# Patient Record
Sex: FEMALE | Race: WHITE
[De-identification: names, ages, dates, MRNs, and addresses within clinical notes are randomized per-mention and may not be internally consistent; named-entity substitution may affect disease eponyms.]

---

## 2017-09-04 ENCOUNTER — HOSPITAL ENCOUNTER (EMERGENCY)
Dept: HOSPITAL 36 - ER | Age: 47
Discharge: HOME | End: 2017-09-04
Payer: MEDICARE

## 2017-09-04 DIAGNOSIS — E78.5: ICD-10-CM

## 2017-09-04 DIAGNOSIS — I10: ICD-10-CM

## 2017-09-04 DIAGNOSIS — R10.11: Primary | ICD-10-CM

## 2017-09-04 DIAGNOSIS — G89.29: ICD-10-CM

## 2017-09-04 DIAGNOSIS — D64.9: ICD-10-CM

## 2017-09-04 DIAGNOSIS — Z90.49: ICD-10-CM

## 2017-09-04 LAB
ALBUMIN/GLOB SERPL: 1.4 {RATIO} (ref 1–1.8)
ALP SERPL-CCNC: 78 U/L (ref 34–104)
ALT SERPL-CCNC: 10 U/L (ref 7–52)
AMPHET UR-MCNC: NEGATIVE NG/ML
ANION GAP SERPL CALC-SCNC: 10.5 MMOL/L (ref 7–16)
ANISOCYTOSIS BLD QL SMEAR: (no result)
AST SERPL-CCNC: 12 U/L (ref 13–39)
BACTERIA #/AREA URNS HPF: (no result) /HPF
BARBITURATES UR-MCNC: NEGATIVE UG/ML
BILIRUB SERPL-MCNC: 0.3 MG/DL (ref 0.3–1)
BILIRUB UR-MCNC: NEGATIVE MG/DL
BUN SERPL-MCNC: 22 MG/DL (ref 7–25)
BUN/CREAT SERPL: 18.3
CALCIUM SERPL-MCNC: 9 MG/DL (ref 8.6–10.3)
CHLORIDE SERPL-SCNC: 105 MEQ/L (ref 98–107)
CHOLEST SERPL-MCNC: 209 MG/DL (ref ?–200)
CO2 SERPL-SCNC: 21.2 MEQ/L (ref 21–31)
COLOR UR: YELLOW
CREAT SERPL-MCNC: 1.2 MG/DL (ref 0.6–1.2)
EOSINOPHIL NFR BLD: 2 % (ref 0–5)
EPI CELLS URNS QL MICRO: (no result) /LPF
ERYTHROCYTE [DISTWIDTH] IN BLOOD BY AUTOMATED COUNT: 14.9 % (ref 11.5–20)
GLOBULIN SER-MCNC: 3.1 GM/DL
GLUCOSE SERPL-MCNC: 124 MG/DL (ref 70–105)
GLUCOSE UR STRIP-MCNC: NEGATIVE MG/DL
HCT VFR BLD CALC: 28.4 % (ref 35–45)
HGB BLD-MCNC: 10 G/DL (ref 4–35)
HGB BLD-MCNC: 9.1 GM/DL (ref 11.7–15.5)
INR PPP: 0.9 (ref 0.5–1.4)
KETONES UR STRIP-MCNC: NEGATIVE MG/DL
MCH RBC QN AUTO: 22.2 PG (ref 27–31)
MCHC RBC AUTO-ENTMCNC: 32 PG (ref 28–36)
MCV RBC AUTO: 69.5 FL (ref 81–100)
METHADONE UR CFM-MCNC: NEGATIVE NG/ML
MICROCYTES BLD QL SMEAR: (no result)
NEUTROPHILS NFR BLD AUTO: 71 % (ref 40–80)
PH UR STRIP: 6 [PH] (ref 4.6–8)
PLAT MORPH BLD: NORMAL
PLATELET # BLD EST: ADEQUATE 10*3/UL
PLATELET # BLD: 245 TH/CMM (ref 150–400)
PMV BLD AUTO: 10.3 FL
POTASSIUM SERPL-SCNC: 3.7 MEQ/L (ref 3.5–5.1)
PROT UR STRIP-MCNC: 100 MG/DL
PROTHROMBIN TIME: 9.3 SECONDS (ref 9.5–11.5)
RBC # BLD AUTO: 4.09 MIL/CMM (ref 3.8–5.1)
RBC # UR STRIP: (no result) /UL
RBC #/AREA URNS HPF: (no result) /HPF (ref 0–5)
RBC MORPH BLD: (no result)
SODIUM SERPL-SCNC: 133 MEQ/L (ref 136–145)
TOTAL CELLS COUNTED BLD: 100
TRIGL SERPL-MCNC: 115 MG/DL (ref ?–150)
UROBILINOGEN UR STRIP-ACNC: 0.2 E.U./DL (ref 0.2–1)
WBC # BLD AUTO: 10.1 TH/CMM (ref 4.8–10.8)
WBC #/AREA URNS HPF: (no result) /HPF (ref 0–5)

## 2017-09-04 PROCEDURE — 80307 DRUG TEST PRSMV CHEM ANLYZR: CPT

## 2017-09-04 PROCEDURE — 99285 EMERGENCY DEPT VISIT HI MDM: CPT

## 2017-09-04 PROCEDURE — 86592 SYPHILIS TEST NON-TREP QUAL: CPT

## 2017-09-04 PROCEDURE — 84443 ASSAY THYROID STIM HORMONE: CPT

## 2017-09-04 PROCEDURE — 81001 URINALYSIS AUTO W/SCOPE: CPT

## 2017-09-04 PROCEDURE — 36415 COLL VENOUS BLD VENIPUNCTURE: CPT

## 2017-09-04 PROCEDURE — 74176 CT ABD & PELVIS W/O CONTRAST: CPT

## 2017-09-04 PROCEDURE — 85027 COMPLETE CBC AUTOMATED: CPT

## 2017-09-04 PROCEDURE — 83036 HEMOGLOBIN GLYCOSYLATED A1C: CPT

## 2017-09-04 PROCEDURE — 80061 LIPID PANEL: CPT

## 2017-09-04 PROCEDURE — 84484 ASSAY OF TROPONIN QUANT: CPT

## 2017-09-04 PROCEDURE — 81025 URINE PREGNANCY TEST: CPT

## 2017-09-04 PROCEDURE — 80053 COMPREHEN METABOLIC PANEL: CPT

## 2017-09-04 PROCEDURE — 71250 CT THORAX DX C-: CPT

## 2017-09-04 PROCEDURE — 96372 THER/PROPH/DIAG INJ SC/IM: CPT

## 2017-09-04 PROCEDURE — 85610 PROTHROMBIN TIME: CPT

## 2017-09-04 PROCEDURE — 85007 BL SMEAR W/DIFF WBC COUNT: CPT

## 2017-09-04 PROCEDURE — 93005 ELECTROCARDIOGRAM TRACING: CPT

## 2017-09-04 PROCEDURE — 85730 THROMBOPLASTIN TIME PARTIAL: CPT

## 2017-09-04 NOTE — ED PHYSICIAN CHART
Chief Complaint/HPI





- Patient Information


Date Seen:: 17


Time Seen:: 20:45


Chief Complaint:: CHEST PAIN


History of Present Illness:: 





THIS IS A 48 YO FEMALE WITH A COMPLAINT OF CHEST PAIN, RIGHT UPPER QUADRANT 

ABDOMINAL PAIN WITH NO SOB, NO NAUSEA, NO VOMITING. SHE ALSO STATES THAT SHE 

HAS WHOLE BODY PAIN.  SHE ASKED FOR DILAUDID BECAUSE SHE RAN OUT OF HERS.  SHE 

DENIE ANY PREVIOUS HEART PROBLEMS. SHE STATES THAT SHE IS CURRENTLY UNDER PAIN 

MANAGEMENT FOR HER ON GOING PROBLEM OF PAIN.


Allergies:: 


 Allergies











Allergy/AdvReac Type Severity Reaction Status Date / Time


 


atorvastatin Allergy   Verified 16 09:01


 


baclofen Allergy   Verified 16 02:42


 


gabapentin Allergy   Verified 16 02:42


 


pregabalin [From Lyrica] Allergy   Verified 16 02:42


 


zolpidem Allergy   Verified 16 09:01











Vitals:: 


 Vital Signs - 8 hr











  17





  20:40


 


Temp 98.6 F


 


HR 85


 


RR 20


 


/78


 


O2 Sat % 100











Historian:: Patient


Review:: Nurse's Note Reviewed, Old Chart Reviewed





Review of Systems





- Review of Systems


General/Constitutional: No fever, No chills, No weight loss, No weakness, No 

diaphoresis, No edema, No loss of appetite


Skin: No skin lesions, No rash, No bruising


Head: No headache, No light-headedness


Eyes: No loss of vision, No pain, No diplopia


ENT: No earache, No nasal drainage, No sore throat, No tinnitus


Neck: No neck pain, No swelling, No thyromegaly, No stiffness, No mass noted


Cardio Vascular: Chest pain, No palpitations, No PND, No orthopnea, No edema


Pulmonary: No SOB, No cough, No wheezing


GI: No nausea, No vomiting, No diarrhea, Pain, No melena, No hematochezia, No 

constipation, No hematemesis


G/U: No dysuria, No frequency, No hematuria


Musculoskeletal: No bone or joint pain, No back pain, No muscle pain


Endocrine: No polyuria, No polydipsia


Psychiatric: No prior psych history, No depression, No anxiety, No suicidal 

ideation


Hematopoietic: No bruising, No lymphadenopathy


Allergic/Immuno: No urticaria, No angioedema


Neurological: No syncope, No focal symptoms, No weakness, No paresthesia, No 

headache, No seizure, No dizziness, No confusion, No vertigo





Past Medical History





- Past Medical History


Obtainable: Yes


Past Medical History: HTN, Dyslipidemia, Arthritis, Other (CHRONIC PAIN DISORDER

)


Family History: None


Social History: Non Smoker, No Alcohol, Illicit Drug Use


Surgical History: Cholecystectomy, other (CATARACT SURGERY, APPENDECTOMY)


Psychiatricy History: Depression





Family Medical History





- Family Member


  ** Mother


History Unknown: Yes


Ethnicity: Non-


Living Status: 


Hx Family Cancer: Yes


Hx Family Hypertension: No


Hx Family Diabetes: Yes





  ** Father


History Unknown: Yes


Living Status: 


Hx Family Congestive Heart Failure: Yes





  ** Maternal Grandmother


History Unknown: Yes


Living Status: 


Hx Family Cancer: Yes


Hx Family Diabetes: Yes





Physical Exam





- Physical Examination


General/Constitutional: Awake, Well-developed, well-nourished, Alert, No 

distress, GCS 15, Non-toxic appearing, Ambulatory


Head: Atraumatic


Eyes: Lids, conjuctiva normal, PERRL, EOMI


Skin: Nl inspection, No rash, No skin lesions, No ecchymosis, Well hydrated, No 

lymphadenopathy


ENMT: External ears, nose nl, Nasal exam nl, Lips, teeth, gums nl


Neck: Nontender, Full ROM w/o pain, No JVD, No nuchal rigidity, No bruit, No 

mass, No stridor


Respiratory: Nl effort/Exclusion, Clear to Auscultation, No Wheeze/Rhonchi/Rales


Cardio Vascular: RRR, No murmur, gallop, rubs, NL S1 S2


GI: No tenderness/rebounding/guarding, No organomegaly, No hernia, Normal BS's, 

Nondistended, No mass/bruits, No McBurney tenderness


: No CVA tenderness


Extremities: No tenderness or effusion, Full ROM, normal strength in all 

extremities, No edema, Normal digits & nails


Neuro/Psych: Alert/oriented, DTR's symmetric, Normal sensory exam, Normal motor 

strength, Judgement/insight normal, Mood normal, Normal gait, No focal deficits


Misc: normal gait, Normal back, No paraspinal tenderness





Labs/Radiology/EKG Results





- Lab Results


Results: 


 Laboratory Tests











  17





  20:45 20:45 20:45


 


WBC   


 


RBC   


 


Hgb   


 


Hct   


 


MCV   


 


MCH   


 


MCHC Differential   


 


RDW   


 


Plt Count   


 


MPV   


 


Neutrophils (Manual)   


 


Lymphocytes   


 


Monocytes   


 


Eosinophils   


 


Platelet Estimate   


 


Platelet Morphology   


 


Anisocytosis   


 


Microcytosis   


 


RBC Morph Micro Appear   


 


PT   


 


INR   


 


PTT (Actin FS)   


 


Sodium   


 


Potassium   


 


Chloride   


 


Carbon Dioxide   


 


Anion Gap   


 


BUN   


 


Creatinine   


 


Est GFR ( Amer)   


 


Est GFR (Non-Af Amer)   


 


BUN/Creatinine Ratio   


 


Glucose   


 


Hemoglobin A1c %   


 


Calcium   


 


Total Bilirubin   


 


AST   


 


ALT   


 


Alkaline Phosphatase   


 


Troponin I   


 


Total Protein   


 


Albumin   


 


Globulin   


 


Albumin/Globulin Ratio   


 


Triglycerides   


 


Cholesterol   


 


LDL Cholesterol Direct   


 


HDL Cholesterol   


 


Urine Source  CLEAN C  


 


Urine Color  YELLOW  


 


Urine Clarity  CLEAR  


 


Urine pH  6.0  


 


Ur Specific Gravity  1.020  


 


Urine Protein  100 H  


 


Urine Glucose (UA)  NEGATIVE  


 


Urine Ketones  NEGATIVE  


 


Urine Blood  TRACE  


 


Urine Nitrate  NEGATIVE  


 


Urine Bilirubin  NEGATIVE  


 


Urine Urobilinogen  0.2  


 


Ur Leukocyte Esterase  NEGATIVE  


 


Urine RBC  2-5  


 


Urine WBC  0-2  


 


Ur Epithelial Cells  MODERATE  


 


Urine Bacteria  OCCASIONAL  


 


Urine Pregnancy Test   NEGATIVE 


 


Urine Opiates Screen    POSITIVE H


 


Urine Methadone Screen    NEGATIVE


 


Ur Barbiturates Screen    NEGATIVE


 


Ur Tricyclics Screen    NEGATIVE


 


Ur Phencyclidine Scrn    NEGATIVE


 


Amphetamines Screen    NEGATIVE


 


U Methamphetamines Scrn    NEGATIVE


 


U Benzodiazepines Scrn    NEGATIVE


 


U Cocaine Metab Screen    NEGATIVE


 


U Cannabinoids Screen    NEGATIVE














  17





  21:10 21:10 21:10


 


WBC    10.1


 


RBC    4.09


 


Hgb    9.1 L


 


Hct    28.4 L


 


MCV    69.5 L


 


MCH    22.2 L


 


MCHC Differential    32.0


 


RDW    14.9


 


Plt Count    245


 


MPV    10.3


 


Neutrophils (Manual)    71


 


Lymphocytes    23


 


Monocytes    4


 


Eosinophils    2


 


Platelet Estimate    ADEQUATE


 


Platelet Morphology    NORMAL


 


Anisocytosis    1+


 


Microcytosis    2+


 


RBC Morph Micro Appear    ABNORMAL


 


PT  9.3 L  


 


INR  0.90  


 


PTT (Actin FS)  20.5 L  


 


Sodium   


 


Potassium   


 


Chloride   


 


Carbon Dioxide   


 


Anion Gap   


 


BUN   


 


Creatinine   


 


Est GFR ( Amer)   


 


Est GFR (Non-Af Amer)   


 


BUN/Creatinine Ratio   


 


Glucose   


 


Hemoglobin A1c %   


 


Calcium   


 


Total Bilirubin   


 


AST   


 


ALT   


 


Alkaline Phosphatase   


 


Troponin I   


 


Total Protein   


 


Albumin   


 


Globulin   


 


Albumin/Globulin Ratio   


 


Triglycerides   115 


 


Cholesterol   209 H 


 


LDL Cholesterol Direct   155 


 


HDL Cholesterol   46 


 


Urine Source   


 


Urine Color   


 


Urine Clarity   


 


Urine pH   


 


Ur Specific Gravity   


 


Urine Protein   


 


Urine Glucose (UA)   


 


Urine Ketones   


 


Urine Blood   


 


Urine Nitrate   


 


Urine Bilirubin   


 


Urine Urobilinogen   


 


Ur Leukocyte Esterase   


 


Urine RBC   


 


Urine WBC   


 


Ur Epithelial Cells   


 


Urine Bacteria   


 


Urine Pregnancy Test   


 


Urine Opiates Screen   


 


Urine Methadone Screen   


 


Ur Barbiturates Screen   


 


Ur Tricyclics Screen   


 


Ur Phencyclidine Scrn   


 


Amphetamines Screen   


 


U Methamphetamines Scrn   


 


U Benzodiazepines Scrn   


 


U Cocaine Metab Screen   


 


U Cannabinoids Screen   














  17





  21:10 21:10 21:10


 


WBC   


 


RBC   


 


Hgb   


 


Hct   


 


MCV   


 


MCH   


 


MCHC Differential   


 


RDW   


 


Plt Count   


 


MPV   


 


Neutrophils (Manual)   


 


Lymphocytes   


 


Monocytes   


 


Eosinophils   


 


Platelet Estimate   


 


Platelet Morphology   


 


Anisocytosis   


 


Microcytosis   


 


RBC Morph Micro Appear   


 


PT   


 


INR   


 


PTT (Actin FS)   


 


Sodium  133 L  


 


Potassium  3.7  


 


Chloride  105  


 


Carbon Dioxide  21.2  


 


Anion Gap  10.5  


 


BUN  22  


 


Creatinine  1.2  


 


Est GFR ( Amer)  > 60.0  


 


Est GFR (Non-Af Amer)  51.6  


 


BUN/Creatinine Ratio  18.3  


 


Glucose  124 H  


 


Hemoglobin A1c %    6.5 H


 


Calcium  9.0  


 


Total Bilirubin  0.3  


 


AST  12 L  


 


ALT  10  


 


Alkaline Phosphatase  78  


 


Troponin I   < 0.01 L 


 


Total Protein  7.4  


 


Albumin  4.3  


 


Globulin  3.1  


 


Albumin/Globulin Ratio  1.4  


 


Triglycerides   


 


Cholesterol   


 


LDL Cholesterol Direct   


 


HDL Cholesterol   


 


Urine Source   


 


Urine Color   


 


Urine Clarity   


 


Urine pH   


 


Ur Specific Gravity   


 


Urine Protein   


 


Urine Glucose (UA)   


 


Urine Ketones   


 


Urine Blood   


 


Urine Nitrate   


 


Urine Bilirubin   


 


Urine Urobilinogen   


 


Ur Leukocyte Esterase   


 


Urine RBC   


 


Urine WBC   


 


Ur Epithelial Cells   


 


Urine Bacteria   


 


Urine Pregnancy Test   


 


Urine Opiates Screen   


 


Urine Methadone Screen   


 


Ur Barbiturates Screen   


 


Ur Tricyclics Screen   


 


Ur Phencyclidine Scrn   


 


Amphetamines Screen   


 


U Methamphetamines Scrn   


 


U Benzodiazepines Scrn   


 


U Cocaine Metab Screen   


 


U Cannabinoids Screen   














- Radiology Results


Results: 





CT SCAN OF THE CHEST = NAD





- EKG Interpretations


EKG Time:: 21:10


Rate & Rhythm: RATE=79 ,SINUS


Axis: LEFT





Assessment





- Assessment


General Assessment: 


CHEST PAIN


ABDOMINAL PAIN


ANEMIA





ED Septic Shock





- .


Is Septic Shock (SBP<90, OR Lactate>4 mmol\L) present?: No





- <6hrs of presentation:


Vital Signs: 


 Vital Signs - 8 hr











  17





  20:40


 


Temp 98.6 F


 


HR 85


 


RR 20


 


/78


 


O2 Sat % 100














Reassessment (Disposition)





- Reassessment


Reassessment Condition:: Improved





- Diagnosis


Diagnosis:: 





ANEMIA


CHRONIC PAIN


ABDOMINAL PAIN





- Aftercare/Follow up Instructions


Aftercare/Follow-Up Instructions:: Counseled pt regarding lab results/diagnosis 

& need follow up, Refer to Discharge Instructions, Counseled pt & family 

regarding lab results/diagnosis & need follow up


Medication Prescribed:: 





SEE YOUR DOCTOR IN THE AM WITH A COPY OF THE LABS.





- Patient Disposition


Discharge/Transfer:: Home


Condition at Disposition:: Improved





ED Discharge Plan





- Patient Disposition


Admit/Discharge/Transfer: PT DISCHARGED HOME


Condition at Disposition: Improved


Instructions:  Anemia, Nonspecific-Brief, Chest Wall Pain, Easy-to-Read, 

Chronic Pain


Additional Instructions: 


FOLLOW UP WITH YOUR DOCTOR IN  THE MORNING AND SHOW HIM THE COPIES OF LAB 

RESULTS AND TO COME BACK TO ER IF SYMPTOMS WORSEN

## 2017-09-05 NOTE — DIAGNOSTIC IMAGING REPORT
CT scan abdomen and pelvis without intravenous contrast



HISTORY: Pain



Total DLP equals 924



CTDI equals 16.5



Axial sections were obtained from the xiphoid process down to the pubic

symphysis.



The liver exhibits a homogeneous parenchyma. No focal lesions. The

spleen appears normal. Surgical changes noted in the gastric/perigastric

region. Additional surgical clips seen within the sonia hepatis region

consistent with a prior cholecystectomy. Findings consistent bilateral

renal cysts are seen. Punctate nonobstructing calcification noted in the

medullary region of the right kidney. Calcification noted in the left

renal hilum that appears vascular.



The exam of the pelvis demonstrates a bulbous shaped somewhat irregular

shaped uterus that may be associated with fibroid changes. An ultrasound

exam would provide additional assessment. Mild right adnexal fullness

with hypodensity is probably related to ovarian cystic changes. Findings

consistent with a tampon seen within the vaginal region. No free fluid

within the pelvis.



Electrode devices seen within the subcutaneous tissues over the right

anterior pelvis and over the left gluteal region. Spinal stimulator

wires traverse the lower thoracic and lumbar spinal canal regions.



IMPRESSION:

1. Surgical changes within the gastric/perigastric region. Findings

consistent with gastric bypass surgery. Intraluminal debris seen within

the native stomach. Additional findings of a prior cholecystectomy

noted.

2. Bulbous shaped irregular shaped uterus along with findings suggesting

fibroid changes. If necessary, a pelvic ultrasound exam would provide

additional assessment.

3. Right adnexal fullness with hypodensity probably related to ovarian

cystic changes

4. Findings consistent with a vaginal tampon

5. Nonobstructing right renal calculus along with bilateral renal cysts

6. Electrode stimulating devices as noted above along with electrode

wires traversing the lower thoracic and lumbar spinal canal areas.

## 2017-09-05 NOTE — DIAGNOSTIC IMAGING REPORT
CT scan of the chest without intravenous contrast



HISTORY: Pain



Total DLP equals 350



CTDI equals 10.3



Axial sections were obtained from a level above the clavicles down to

level below the diaphragm.



The heart size is normal. No abnormal mediastinal masses. Mild

atherosclerotic calcination seen within the aorta and coronary artery

regions. The hilar areas are unremarkable. No free fluid in the pelvis.

No significant abnormal focal pulmonary parenchymal processes.



Suggestion of nodular changes within the thyroid gland. A thyroid

ultrasound exam would provide additional assessment.



Limited sections below the diaphragm demonstrate changes of a prior

cholecystectomy along with surgical changes within the

gastric/perigastric area. Intraluminal debris seen within the native

stomach. Significance should be correlated clinically.



IMPRESSION:

1. No acute abnormalities

2. Mild atherosclerotic coronary artery calcification

3. Suggestion of thyroid nodules. A thyroid ultrasound exam would

provide additional detail.

4. Surgical changes including findings of prior gastric bypass surgery.

Intraluminal debris seen within the somewhat distended native stomach.

Significance should be correlated clinically.

## 2018-03-18 ENCOUNTER — HOSPITAL ENCOUNTER (EMERGENCY)
Dept: HOSPITAL 4 - SED | Age: 48
Discharge: HOME | End: 2018-03-18
Payer: COMMERCIAL

## 2018-03-18 VITALS — SYSTOLIC BLOOD PRESSURE: 165 MMHG

## 2018-03-18 VITALS — SYSTOLIC BLOOD PRESSURE: 132 MMHG

## 2018-03-18 VITALS — HEIGHT: 68 IN | BODY MASS INDEX: 31.37 KG/M2 | WEIGHT: 207 LBS

## 2018-03-18 DIAGNOSIS — Z79.899: ICD-10-CM
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
## 2018-03-18 DIAGNOSIS — Z88.8: ICD-10-CM

## 2018-03-18 DIAGNOSIS — S80.212A: ICD-10-CM

## 2018-03-18 DIAGNOSIS — Z79.82: ICD-10-CM

## 2018-03-18 DIAGNOSIS — Z86.73: ICD-10-CM

## 2018-03-18 DIAGNOSIS — Z90.49: ICD-10-CM

## 2018-03-18 DIAGNOSIS — S00.81XA: Primary | ICD-10-CM
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
## 2018-03-18 DIAGNOSIS — Z85.41: ICD-10-CM

## 2018-03-18 DIAGNOSIS — I10: ICD-10-CM
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
## 2018-03-18 DIAGNOSIS — E11.9: ICD-10-CM

## 2018-03-18 PROCEDURE — 73140 X-RAY EXAM OF FINGER(S): CPT

## 2018-03-18 PROCEDURE — 70450 CT HEAD/BRAIN W/O DYE: CPT

## 2018-03-18 PROCEDURE — 99284 EMERGENCY DEPT VISIT MOD MDM: CPT

## 2018-03-18 PROCEDURE — 73564 X-RAY EXAM KNEE 4 OR MORE: CPT

## 2018-03-18 PROCEDURE — 96372 THER/PROPH/DIAG INJ SC/IM: CPT

## 2018-03-18 PROCEDURE — 73000 X-RAY EXAM OF COLLAR BONE: CPT

## 2018-03-18 PROCEDURE — 70486 CT MAXILLOFACIAL W/O DYE: CPT

## 2018-06-11 ENCOUNTER — HOSPITAL ENCOUNTER (INPATIENT)
Dept: HOSPITAL 4 - SED | Age: 48
Discharge: LEFT BEFORE DISCHARGE | DRG: 812 | End: 2018-06-11
Attending: INTERNAL MEDICINE | Admitting: INTERNAL MEDICINE
Payer: COMMERCIAL

## 2018-06-11 VITALS — SYSTOLIC BLOOD PRESSURE: 141 MMHG

## 2018-06-11 VITALS — SYSTOLIC BLOOD PRESSURE: 152 MMHG

## 2018-06-11 VITALS — SYSTOLIC BLOOD PRESSURE: 136 MMHG

## 2018-06-11 VITALS — SYSTOLIC BLOOD PRESSURE: 155 MMHG

## 2018-06-11 VITALS — BODY MASS INDEX: 31.52 KG/M2 | HEIGHT: 68 IN | WEIGHT: 208 LBS

## 2018-06-11 DIAGNOSIS — G89.4: ICD-10-CM

## 2018-06-11 DIAGNOSIS — E87.6: ICD-10-CM

## 2018-06-11 DIAGNOSIS — Z53.21: ICD-10-CM

## 2018-06-11 DIAGNOSIS — Z85.830: ICD-10-CM

## 2018-06-11 DIAGNOSIS — Z79.899: ICD-10-CM

## 2018-06-11 DIAGNOSIS — E66.9: ICD-10-CM

## 2018-06-11 DIAGNOSIS — Z79.84: ICD-10-CM

## 2018-06-11 DIAGNOSIS — E07.9: ICD-10-CM

## 2018-06-11 DIAGNOSIS — E11.9: ICD-10-CM

## 2018-06-11 DIAGNOSIS — D50.9: Primary | ICD-10-CM

## 2018-06-11 DIAGNOSIS — M79.89: ICD-10-CM

## 2018-06-11 DIAGNOSIS — Z85.89: ICD-10-CM

## 2018-06-11 DIAGNOSIS — Z86.73: ICD-10-CM

## 2018-06-11 DIAGNOSIS — I10: ICD-10-CM

## 2018-06-11 DIAGNOSIS — Z79.82: ICD-10-CM

## 2018-06-11 DIAGNOSIS — J45.909: ICD-10-CM

## 2018-06-11 DIAGNOSIS — Z88.8: ICD-10-CM

## 2018-06-11 DIAGNOSIS — G35: ICD-10-CM

## 2018-06-11 DIAGNOSIS — M54.9: ICD-10-CM

## 2018-06-11 LAB
ALBUMIN SERPL BCP-MCNC: 3.2 G/DL (ref 3.4–4.8)
ALT SERPL W P-5'-P-CCNC: 20 U/L (ref 12–78)
ANION GAP SERPL CALCULATED.3IONS-SCNC: 10 MMOL/L (ref 5–15)
AST SERPL W P-5'-P-CCNC: 25 U/L (ref 10–37)
BASOPHILS # BLD AUTO: 0.1 K/UL (ref 0–0.2)
BASOPHILS NFR BLD AUTO: 0.9 % (ref 0–2)
BILIRUB SERPL-MCNC: 0.3 MG/DL (ref 0–1)
BUN SERPL-MCNC: 18 MG/DL (ref 8–21)
CALCIUM SERPL-MCNC: 8.2 MG/DL (ref 8.4–11)
CHLORIDE SERPL-SCNC: 104 MMOL/L (ref 98–107)
CREAT SERPL-MCNC: 1.2 MG/DL (ref 0.55–1.3)
EOSINOPHIL # BLD AUTO: 0.2 K/UL (ref 0–0.4)
EOSINOPHIL NFR BLD AUTO: 3.7 % (ref 0–4)
ERYTHROCYTE [DISTWIDTH] IN BLOOD BY AUTOMATED COUNT: 20.8 % (ref 9–15)
GFR SERPL CREATININE-BSD FRML MDRD: 62 ML/MIN (ref 90–?)
GLUCOSE SERPL-MCNC: 110 MG/DL (ref 70–99)
HCT VFR BLD AUTO: 22.7 % (ref 36–48)
HGB BLD-MCNC: 6.8 G/DL (ref 12–16)
INR PPP: 1.1 (ref 0.8–1.2)
LYMPHOCYTES # BLD AUTO: 2.6 K/UL (ref 1–5.5)
LYMPHOCYTES NFR BLD AUTO: 38.2 % (ref 20.5–51.5)
MCH RBC QN AUTO: 19 PG (ref 27–31)
MCHC RBC AUTO-ENTMCNC: 30 % (ref 32–36)
MCV RBC AUTO: 64 FL (ref 79–98)
MONOCYTES # BLD MANUAL: 0.4 K/UL (ref 0–1)
MONOCYTES # BLD MANUAL: 5.8 % (ref 1.7–9.3)
NEUTROPHILS # BLD AUTO: 3.4 K/UL (ref 1.8–7.7)
NEUTROPHILS NFR BLD AUTO: 51.4 % (ref 40–70)
PLATELET # BLD AUTO: 159 K/UL (ref 130–430)
POTASSIUM SERPL-SCNC: 3.1 MMOL/L (ref 3.5–5.1)
PROTHROMBIN TIME: 11.2 SECS (ref 9.5–12.5)
RBC # BLD AUTO: 3.53 MIL/UL (ref 4.2–6.2)
SODIUM SERPLBLD-SCNC: 138 MMOL/L (ref 136–145)
TIBC SERPL-MCNC: 332 UG/DL (ref 250–450)
WBC # BLD AUTO: 6.7 K/UL (ref 4.8–10.8)

## 2018-06-11 PROCEDURE — P9021 RED BLOOD CELLS UNIT: HCPCS

## 2018-06-11 PROCEDURE — 30233N1 TRANSFUSION OF NONAUTOLOGOUS RED BLOOD CELLS INTO PERIPHERAL VEIN, PERCUTANEOUS APPROACH: ICD-10-PCS | Performed by: INTERNAL MEDICINE

## 2018-06-11 RX ADMIN — HYDROMORPHONE HYDROCHLORIDE PRN MG: 2 INJECTION INTRAMUSCULAR; INTRAVENOUS; SUBCUTANEOUS at 06:55

## 2018-06-11 RX ADMIN — HYDROMORPHONE HYDROCHLORIDE PRN MG: 2 INJECTION INTRAMUSCULAR; INTRAVENOUS; SUBCUTANEOUS at 11:20

## 2018-06-11 NOTE — NUR
Patient to ER via triage with c/o swelling to bilateral lower extremities x 3 
days. Patient is able to ambulate to bed 2 with slow, steady gait without 
difficulty. Patient is awake, alert and oriented in no acute distress, vital 
signs stable, respirations even and unlabored, skin warm and dry to touch. 
Patient is being evaluated by Dr Peña at bedside evaluating patient. Patient 
reports that she has chronic pain and is on a Dilaudid pump. Patient reports 
chronic back pain rated at an 8, patient declined any pain medication at this 
time.

## 2018-06-11 NOTE — NUR
initial notes

rec patient awake alert with iv on the l c and blood transfusion infusing. no infiltration 
noted but wants iv site to be moved. radha leg swelling noted. resp easy and unlabored. no sob 
noted. bed in mi high position and refused to have it down. stating that she has a hosp bed 
at home. reminded to call nurse when needing assistance but both side rails up and locked. 
will continue to monitor patient.

## 2018-06-11 NOTE — NUR
SPOKE TO DR. QUESADA/NEW ORDERS RECEIVED

Reported patient's 10/10 generalized pain and behavior to Dr. Quesada. Dr. Quesada stated "she's 
the one with the Dilaudid pain pump." I confirmed that the patient stated she did have a 
pain pump to her right lower abdomen. Dr. Quesada ordered Dilaudid 1mg IVP every 3 hours as 
needed for severe (7-10) pain and Dilaudid 0.5mg IVP every 3 hours as needed for moderate 
(4-6) pain. Orders were entered in system. Will wait for inhouse pharmacy to open to call 
for verification.

## 2018-06-11 NOTE — NUR
SUSANNE Kasper from lab called to report first unit of PRBCs is available. Patient stated that blood 
transfusion risk and benefits were explained by ER doctor. Witnessed consent from patient to 
be obtained.

## 2018-06-11 NOTE — NUR
Dr Peña at bedside speaking with patient regarding results and plan of care. 
Questions answered by Dr Peña.

## 2018-06-11 NOTE — NUR
closing notes

pt signed ama despite of dr soriano explanation of doing more test for the patient and 
consulting a gi md. stated that patient has a dental appt today re teeth extraction and bone 
grafting next week. also stated rosalva she needs to go home bec her animals had a vet appt and 
she doesnt want them to die bec of starvation. explained to patient re over the counter iron 
supplement liquid form as per dr soriano for proper absorption. advised to be escorted by 
husam since patient has a lot of belongings but refused.

## 2018-06-11 NOTE — NUR
Patient spit up 1 tab of k-dur during administration stating "Those taste 
terrible! I'm can't take those". Md made aware. Patient having nausea and 
vomiting at this time.

## 2018-06-11 NOTE — NUR
Patient refusing to have both side rails up. Patient received demerol IVP and 
is a Fall risk. Need for side rails to be raised explained to patient, and 
patient consenting to "only one raised. I know I could fall, but I can't stand 
both rails being up."

## 2018-06-11 NOTE — NUR
CLOSING NOTES/REFUSING FOR BED ALARM AND BED TO BE LOWERED

Patient's care and report was endorsed to day shift nurse. Patient is awake, resting, and 
currently receiving the first unit of PRBCs at this time. She is refusing for the bed rails 
to be up, bed alarm and for the height to be at the lowest position. Nurses and staff 
educated patient on safety but continues to refuse for staff to adjust and stated "I have a 
hospital bed at home!" I can take care of myself and walk fine! Day shift nurse was also 
made aware on patient's current demands.

## 2018-06-11 NOTE — NUR
15 MIN VITALS AFTER TRANSFUSION INITIATION

Patient is awake, alert, and oriented with no s/s of acute distress. Vital signs are stable 
after 15 minutes of blood transfusion initiation: 9

-------------------------------------------------------------------------------

Addendum: 06/11/18 at 0802 by Ana Dsouza RN

-------------------------------------------------------------------------------

15 MIN VITALS AFTER TRANSFUSION INITIATION

Patient is awake, alert, and oriented with no s/s of acute distress. Vital signs are stable 
after 15 minutes of blood transfusion initiation: 

-------------------------------------------------------------------------------

Addendum: 06/11/18 at 0803 by Ana Dsouza RN

-------------------------------------------------------------------------------

15 MIN VITALS AFTER TRANSFUSION INITIATION

Patient is awake, alert, and oriented with no s/s of acute distress. Vital signs are stable 
after 15 minutes of blood transfusion initiation: ***97.9F TEMP, 16 RR, 97% O2 SAT, 83 HR, 
141/64 BP.***

## 2018-06-11 NOTE — NUR
Patient stating intention to leave AMA after blood transfusion stating" Well I 
don't really want to stay long, how soon after a transfusion can I just leave?" 
MD made aware and explanation given by MD Dr. Peña on leaving AMA. Patient 
verbalized understanding of risks associated with leaving AMA .

## 2018-06-11 NOTE — NUR
SECURITY AND ADMIT NURSE ESCORTED TO BRING HOME MEDS TO PRIVATE VEHICLE

Patient was refusing to surrender her home medications to nurses to bring to pharmacy per 
hospital policy. Risks and benefits were discussed. Patient demanded that her home 
medications either stay with her or she needs to bring them to her car instead. Patient was 
assisted to her private vehicle with admit nurse, ALBERTO Meade, and security. Patient tolerated 
well and ambulated to assigned room in [address]B.

## 2018-06-11 NOTE — NUR
BT INITIATION:

Consent signed per PATIENT agreeing to administration of blood.  Blood has been type and 
crossmatched.  Blood sent from blood bank.  Information on unit of blood checked against 
patient wristband at bedside by two nurses.  All information matches.  Patient or 
responsible party informed of potential complications associated with blood transfusion.  
Informed of possible transfusion reaction symptoms.  Aware of need to notify nurse at once 
of itching, shortness of breath, flushing, feeling of impending doom, or other symptoms not 
previously present.  Vital signs taken within 5 minutes prior to initiation of transfusion.  
RN will remain with patient for first 15 minutes of transfusion at which time vital signs 
will be re-assessed. VS: 97.7F, 78 HR, 16 RR, 136/80 BP. 

-------------------------------------------------------------------------------

Addendum: 06/11/18 at 0818 by Ana Dsouza RN

-------------------------------------------------------------------------------

BT INITIATION:

Consent signed per PATIENT agreeing to administration of blood.  Blood has been type and 
crossmatched.  Blood sent from blood bank.  Information on unit of blood checked against 
patient wristband at bedside by two nurses.  All information matches.  Patient or 
responsible party informed of potential complications associated with blood transfusion.  
Informed of possible transfusion reaction symptoms.  Aware of need to notify nurse at once 
of itching, shortness of breath, flushing, feeling of impending doom, or other symptoms not 
previously present.  Vital signs taken within 5 minutes prior to initiation of transfusion.  
RN will remain with patient for first 15 minutes of transfusion at which time vital signs 
will be re-assessed. ***PRETRANSFUSION VS AT 0700: 97.7F, 78 HR, 16 RR, 136/80 BP.***

## 2018-06-11 NOTE — NUR
Medication reconciliation completed with information provided by Patient. Any 
prior medication reconciliation on file was reviewed and corrected.

## 2018-06-11 NOTE — NUR
Patient has numerous belongings: Black lap top bag, grocery bag with popcorn, 
cooler with soda, tote back with clothing, belongings bag with clothing, bag 
with medication. Patient states medication has not changed, appeared upset when 
told she cannot take home medications while in facility unless approved by MD.

## 2018-06-11 NOTE — NUR
PAGING DR. QUESADA TO REPORT PATIENT'S SEVERE PAIN

Patient is yelling c/o severe "10/10" generalized pain, demanding that she receive pain 
medication now or she will take her home pain medications that she is refusing to surrender 
in the admit room with nurses present: Halina, RN, Brady, RN and Ana RN. Spoke to 
Tierney from Dr. Quesada's exchange to report and request for orders. Will wait for MD to call 
back. Security was paged to help deescalate situation as well.

## 2018-06-11 NOTE — NUR
Patient will be admitted to care of Dr. Delgado.  Admitted to Med-Surg unit.  
Will go to room 135.  Belongings list completed.  Summary report printed. 
Report will be given at bedside.

## 2018-06-11 NOTE — NUR
ADMISSION NOTE

Received patient from ER via [facility], received report from Brunilda DOMINGUEZ. Patient admitted with 
diagnosis of leg swelling, anemia. Patient oriented to hospital routine, call light, 
toileting and safety-patient verbalized understanding.

## 2018-06-11 NOTE — NUR
rounds

seen by dr soriano at bedside and despite of explanation pt will be signing ama afetr the 
blood transfusion.

## 2018-06-11 NOTE — NUR
rounds

blood transfusion completed and 2nd unit just started. checked with maureen morgan admit nurse at 
bedside.  stayed at bedside for 15 min and  educated patient re any blood transfusion 
reaction  like sob, chest pain, chills fever and etc. no acute distress noted. call light 
within reached.

## 2018-06-11 NOTE — NUR
Patient states she does not want to be admitted until she can get care for her 
dogs at home. MD made aware.

## 2018-06-11 NOTE — NUR
rounds

blood transfusion infusing well and no untoward reaction noted. on and off c/o pain and 
stated dr soriano was called to increase pain med. no sob noted.

## 2018-06-11 NOTE — NUR
DEMANDED TO CHANGE BED LINEN AND GOWN

Patient demanded to change out of her nightgown due to IV site leaking. IV site was 
assessed, fixed and patency was confirmed. Patient's personal red colored nightgown was 
soaked in cold water. Bed linen was changed. Gown applied to patient.

## 2018-06-12 LAB
FOLATE (FOLIC ACID): 6.2 NG/ML (ref 3–?)
VIT B12 SERPL-MCNC: 1372 PG/ML (ref 232–1245)

## 2018-07-29 ENCOUNTER — HOSPITAL ENCOUNTER (EMERGENCY)
Dept: HOSPITAL 4 - SED | Age: 48
Discharge: HOME | End: 2018-07-29
Payer: COMMERCIAL

## 2018-07-29 VITALS — WEIGHT: 198 LBS | BODY MASS INDEX: 29.33 KG/M2 | HEIGHT: 69 IN

## 2018-07-29 VITALS — SYSTOLIC BLOOD PRESSURE: 114 MMHG

## 2018-07-29 VITALS — SYSTOLIC BLOOD PRESSURE: 126 MMHG

## 2018-07-29 DIAGNOSIS — Z79.899: ICD-10-CM

## 2018-07-29 DIAGNOSIS — Z86.73: ICD-10-CM

## 2018-07-29 DIAGNOSIS — Z98.84: ICD-10-CM

## 2018-07-29 DIAGNOSIS — I10: ICD-10-CM

## 2018-07-29 DIAGNOSIS — N28.9: Primary | ICD-10-CM

## 2018-07-29 DIAGNOSIS — Z88.8: ICD-10-CM

## 2018-07-29 DIAGNOSIS — Z85.41: ICD-10-CM

## 2018-07-29 DIAGNOSIS — Z85.830: ICD-10-CM

## 2018-07-29 DIAGNOSIS — R60.9: ICD-10-CM

## 2018-07-29 DIAGNOSIS — J45.909: ICD-10-CM

## 2018-07-29 LAB
ALBUMIN SERPL BCP-MCNC: 3.5 G/DL (ref 3.4–4.8)
ALT SERPL W P-5'-P-CCNC: 42 U/L (ref 12–78)
ANION GAP SERPL CALCULATED.3IONS-SCNC: 12 MMOL/L (ref 5–15)
AST SERPL W P-5'-P-CCNC: 40 U/L (ref 10–37)
BASOPHILS # BLD AUTO: 0.1 K/UL (ref 0–0.2)
BASOPHILS NFR BLD AUTO: 1.5 % (ref 0–2)
BILIRUB SERPL-MCNC: 0.4 MG/DL (ref 0–1)
BUN SERPL-MCNC: 37 MG/DL (ref 8–21)
CALCIUM SERPL-MCNC: 8.3 MG/DL (ref 8.4–11)
CHLORIDE SERPL-SCNC: 104 MMOL/L (ref 98–107)
CREAT SERPL-MCNC: 2.36 MG/DL (ref 0.55–1.3)
EOSINOPHIL # BLD AUTO: 0.1 K/UL (ref 0–0.4)
EOSINOPHIL NFR BLD AUTO: 2.6 % (ref 0–4)
ERYTHROCYTE [DISTWIDTH] IN BLOOD BY AUTOMATED COUNT: 25.3 % (ref 9–15)
GFR SERPL CREATININE-BSD FRML MDRD: 28 ML/MIN (ref 90–?)
GLUCOSE SERPL-MCNC: 113 MG/DL (ref 70–99)
HCT VFR BLD AUTO: 34.1 % (ref 36–48)
HGB BLD-MCNC: 10.9 G/DL (ref 12–16)
LYMPHOCYTES # BLD AUTO: 2.2 K/UL (ref 1–5.5)
LYMPHOCYTES NFR BLD AUTO: 40.6 % (ref 20.5–51.5)
MCH RBC QN AUTO: 24 PG (ref 27–31)
MCHC RBC AUTO-ENTMCNC: 32 % (ref 32–36)
MCV RBC AUTO: 77 FL (ref 79–98)
MONOCYTES # BLD MANUAL: 0.3 K/UL (ref 0–1)
MONOCYTES # BLD MANUAL: 5 % (ref 1.7–9.3)
NEUTROPHILS # BLD AUTO: 2.8 K/UL (ref 1.8–7.7)
NEUTROPHILS NFR BLD AUTO: 50.3 % (ref 40–70)
PLATELET # BLD AUTO: 155 K/UL (ref 130–430)
POTASSIUM SERPL-SCNC: 3.9 MMOL/L (ref 3.5–5.1)
RBC # BLD AUTO: 4.46 MIL/UL (ref 4.2–6.2)
SODIUM SERPLBLD-SCNC: 139 MMOL/L (ref 136–145)
WBC # BLD AUTO: 5.5 K/UL (ref 4.8–10.8)

## 2018-07-29 PROCEDURE — 85025 COMPLETE CBC W/AUTO DIFF WBC: CPT

## 2018-07-29 PROCEDURE — 71045 X-RAY EXAM CHEST 1 VIEW: CPT

## 2018-07-29 PROCEDURE — 80053 COMPREHEN METABOLIC PANEL: CPT

## 2018-07-29 PROCEDURE — 96376 TX/PRO/DX INJ SAME DRUG ADON: CPT

## 2018-07-29 PROCEDURE — 99285 EMERGENCY DEPT VISIT HI MDM: CPT

## 2018-07-29 PROCEDURE — 36415 COLL VENOUS BLD VENIPUNCTURE: CPT

## 2018-07-29 PROCEDURE — 96374 THER/PROPH/DIAG INJ IV PUSH: CPT

## 2018-07-29 PROCEDURE — 93970 EXTREMITY STUDY: CPT

## 2018-07-29 PROCEDURE — 83880 ASSAY OF NATRIURETIC PEPTIDE: CPT

## 2018-07-29 NOTE — NUR
Pt placed to ER bed 08. Pt c/o swelling to BLE x 3 days. 3+ pitting edema with 
~40 lb weight loss, then gain of 8 lbs. Generalized pain r/t fibromyalgia. 
Denies C/P or SOB.

## 2018-07-29 NOTE — NUR
Patient given written and verbal discharge instructions and verbalizes 
understanding.  ER MD discussed with patient the results and treatment 
provided. Patient in stable condition. ID arm band removed. IV catheter removed 
intact and dressing applied, no active bleeding.

Rx of Lasix given. Patient educated on pain management and to follow up with 
PMD. Pain Scale 0/10.

Opportunity for questions provided and answered. Medication side effect fact 
sheet provided.

## 2018-08-22 ENCOUNTER — HOSPITAL ENCOUNTER (EMERGENCY)
Dept: HOSPITAL 4 - SED | Age: 48
Discharge: HOME | End: 2018-08-22
Payer: COMMERCIAL

## 2018-08-22 VITALS — WEIGHT: 189 LBS | HEIGHT: 68 IN | BODY MASS INDEX: 28.64 KG/M2

## 2018-08-22 VITALS — SYSTOLIC BLOOD PRESSURE: 112 MMHG

## 2018-08-22 VITALS — SYSTOLIC BLOOD PRESSURE: 116 MMHG
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
## 2018-08-22 DIAGNOSIS — E11.9: ICD-10-CM

## 2018-08-22 DIAGNOSIS — Z90.49: ICD-10-CM
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
## 2018-08-22 DIAGNOSIS — S52.612A: ICD-10-CM

## 2018-08-22 DIAGNOSIS — M79.7: ICD-10-CM
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
## 2018-08-22 DIAGNOSIS — S52.532A: Primary | ICD-10-CM

## 2018-08-22 DIAGNOSIS — J45.909: ICD-10-CM

## 2018-08-22 DIAGNOSIS — I10: ICD-10-CM

## 2018-09-07 ENCOUNTER — HOSPITAL ENCOUNTER (OUTPATIENT)
Dept: HOSPITAL 4 - SDS | Age: 48
Discharge: HOME | End: 2018-09-07
Attending: ORTHOPAEDIC SURGERY
Payer: COMMERCIAL

## 2018-09-07 VITALS — WEIGHT: 197 LBS | BODY MASS INDEX: 29.86 KG/M2 | HEIGHT: 68 IN

## 2018-09-07 VITALS — SYSTOLIC BLOOD PRESSURE: 128 MMHG

## 2018-09-07 DIAGNOSIS — Z98.84: ICD-10-CM

## 2018-09-07 DIAGNOSIS — Z79.899: ICD-10-CM
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
## 2018-09-07 DIAGNOSIS — Z88.8: ICD-10-CM

## 2018-09-07 DIAGNOSIS — Z98.41: ICD-10-CM

## 2018-09-07 DIAGNOSIS — N28.9: ICD-10-CM

## 2018-09-07 DIAGNOSIS — Z83.3: ICD-10-CM

## 2018-09-07 DIAGNOSIS — F15.90: ICD-10-CM

## 2018-09-07 DIAGNOSIS — G56.02: ICD-10-CM

## 2018-09-07 DIAGNOSIS — Z82.49: ICD-10-CM

## 2018-09-07 DIAGNOSIS — Z90.49: ICD-10-CM

## 2018-09-07 DIAGNOSIS — Z86.73: ICD-10-CM
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
## 2018-09-07 DIAGNOSIS — Z87.891: ICD-10-CM

## 2018-09-07 DIAGNOSIS — M19.90: ICD-10-CM

## 2018-09-07 DIAGNOSIS — Z98.42: ICD-10-CM

## 2018-09-07 DIAGNOSIS — Z79.82: ICD-10-CM
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
## 2018-09-07 DIAGNOSIS — J45.909: ICD-10-CM

## 2018-09-07 DIAGNOSIS — Z82.61: ICD-10-CM

## 2018-09-07 DIAGNOSIS — Z85.41: ICD-10-CM

## 2018-09-07 DIAGNOSIS — E11.9: ICD-10-CM

## 2018-09-07 DIAGNOSIS — I10: ICD-10-CM

## 2018-09-07 DIAGNOSIS — S52.532A: Primary | ICD-10-CM

## 2018-09-07 DIAGNOSIS — Z85.830: ICD-10-CM

## 2018-09-07 DIAGNOSIS — Z98.890: ICD-10-CM
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
## 2018-09-07 LAB
ANION GAP SERPL CALCULATED.3IONS-SCNC: 6 MMOL/L (ref 5–15)
APPEARANCE UR: CLEAR
BASOPHILS # BLD AUTO: 0.1 K/UL (ref 0–0.2)
BASOPHILS NFR BLD AUTO: 1.5 % (ref 0–2)
BILIRUB UR QL STRIP: NEGATIVE
BUN SERPL-MCNC: 32 MG/DL (ref 8–21)
CALCIUM SERPL-MCNC: 8.7 MG/DL (ref 8.4–11)
CHLORIDE SERPL-SCNC: 106 MMOL/L (ref 98–107)
COLOR UR: YELLOW
CREAT SERPL-MCNC: 1.5 MG/DL (ref 0.55–1.3)
EOSINOPHIL # BLD AUTO: 0.1 K/UL (ref 0–0.4)
EOSINOPHIL NFR BLD AUTO: 1.6 % (ref 0–4)
ERYTHROCYTE [DISTWIDTH] IN BLOOD BY AUTOMATED COUNT: 23 % (ref 9–15)
GFR SERPL CREATININE-BSD FRML MDRD: 48 ML/MIN (ref 90–?)
GLUCOSE SERPL-MCNC: 87 MG/DL (ref 70–99)
GLUCOSE UR STRIP-MCNC: NEGATIVE MG/DL
HCG UR QL: NEGATIVE
HCT VFR BLD AUTO: 32.1 % (ref 36–48)
HGB BLD-MCNC: 10.7 G/DL (ref 12–16)
HGB UR QL STRIP: NEGATIVE
KETONES UR STRIP-MCNC: NEGATIVE MG/DL
LEUKOCYTE ESTERASE UR QL STRIP: NEGATIVE
LYMPHOCYTES # BLD AUTO: 1.7 K/UL (ref 1–5.5)
LYMPHOCYTES NFR BLD AUTO: 35.3 % (ref 20.5–51.5)
MCH RBC QN AUTO: 26 PG (ref 27–31)
MCHC RBC AUTO-ENTMCNC: 33 % (ref 32–36)
MCV RBC AUTO: 80 FL (ref 79–98)
MONOCYTES # BLD MANUAL: 0.3 K/UL (ref 0–1)
MONOCYTES # BLD MANUAL: 6.5 % (ref 1.7–9.3)
NEUTROPHILS # BLD AUTO: 2.5 K/UL (ref 1.8–7.7)
NEUTROPHILS NFR BLD AUTO: 55.1 % (ref 40–70)
NITRITE UR QL STRIP: NEGATIVE
PH UR STRIP: 6 [PH] (ref 5–8)
PLATELET # BLD AUTO: 196 K/UL (ref 130–430)
POTASSIUM SERPL-SCNC: 4.6 MMOL/L (ref 3.5–5.1)
PROT UR QL STRIP: NEGATIVE
RBC # BLD AUTO: 4.04 MIL/UL (ref 4.2–6.2)
SODIUM SERPLBLD-SCNC: 140 MMOL/L (ref 136–145)
SP GR UR STRIP: 1.01 (ref 1–1.03)
UROBILINOGEN UR STRIP-MCNC: 0.2 MG/DL (ref 0.2–1)
WBC # BLD AUTO: 4.7 K/UL (ref 4.8–10.8)

## 2018-09-07 PROCEDURE — 25606 PERQ SKEL FIXJ DSTL RDL FX: CPT

## 2018-09-07 PROCEDURE — 80048 BASIC METABOLIC PNL TOTAL CA: CPT

## 2018-09-07 PROCEDURE — 85025 COMPLETE CBC W/AUTO DIFF WBC: CPT

## 2018-09-07 PROCEDURE — 93005 ELECTROCARDIOGRAM TRACING: CPT

## 2018-09-07 PROCEDURE — 84703 CHORIONIC GONADOTROPIN ASSAY: CPT

## 2018-09-07 PROCEDURE — 71045 X-RAY EXAM CHEST 1 VIEW: CPT

## 2018-09-07 PROCEDURE — 36415 COLL VENOUS BLD VENIPUNCTURE: CPT

## 2018-09-07 PROCEDURE — 81003 URINALYSIS AUTO W/O SCOPE: CPT

## 2018-09-07 PROCEDURE — 76001: CPT

## 2018-09-07 PROCEDURE — C1713 ANCHOR/SCREW BN/BN,TIS/BN: HCPCS

## 2018-09-07 RX ADMIN — FENTANYL CITRATE PRN MCG: 50 INJECTION, SOLUTION INTRAMUSCULAR; INTRAVENOUS at 10:00

## 2018-09-07 RX ADMIN — FENTANYL CITRATE PRN MCG: 50 INJECTION, SOLUTION INTRAMUSCULAR; INTRAVENOUS at 10:40

## 2018-10-14 ENCOUNTER — HOSPITAL ENCOUNTER (EMERGENCY)
Dept: HOSPITAL 4 - SED | Age: 48
LOS: 1 days | Discharge: HOME | End: 2018-10-15
Payer: COMMERCIAL

## 2018-10-14 VITALS — WEIGHT: 189 LBS | BODY MASS INDEX: 28.64 KG/M2 | HEIGHT: 68 IN

## 2018-10-14 VITALS — SYSTOLIC BLOOD PRESSURE: 133 MMHG

## 2018-10-14 DIAGNOSIS — I10: ICD-10-CM
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
## 2018-10-14 DIAGNOSIS — Z88.8: ICD-10-CM
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
## 2018-10-14 DIAGNOSIS — E11.9: ICD-10-CM

## 2018-10-14 DIAGNOSIS — J45.909: ICD-10-CM

## 2018-10-14 DIAGNOSIS — Z88.6: ICD-10-CM

## 2018-10-14 DIAGNOSIS — M79.7: ICD-10-CM

## 2018-10-14 DIAGNOSIS — Z88.1: ICD-10-CM

## 2018-10-14 DIAGNOSIS — S02.2XXA: Primary | ICD-10-CM

## 2018-10-14 DIAGNOSIS — Z90.49: ICD-10-CM

## 2018-10-14 PROCEDURE — 70486 CT MAXILLOFACIAL W/O DYE: CPT

## 2018-10-14 PROCEDURE — 99284 EMERGENCY DEPT VISIT MOD MDM: CPT

## 2018-10-14 PROCEDURE — 73100 X-RAY EXAM OF WRIST: CPT

## 2018-10-14 PROCEDURE — 96372 THER/PROPH/DIAG INJ SC/IM: CPT

## 2018-10-14 PROCEDURE — 81025 URINE PREGNANCY TEST: CPT

## 2018-10-15 VITALS — SYSTOLIC BLOOD PRESSURE: 145 MMHG

## 2019-10-08 ENCOUNTER — HOSPITAL ENCOUNTER (EMERGENCY)
Dept: HOSPITAL 4 - SED | Age: 49
Discharge: HOME | End: 2019-10-08
Payer: COMMERCIAL

## 2019-10-08 VITALS — SYSTOLIC BLOOD PRESSURE: 134 MMHG

## 2019-10-08 VITALS — BODY MASS INDEX: 31.1 KG/M2 | HEIGHT: 69 IN | WEIGHT: 210 LBS

## 2019-10-08 VITALS — SYSTOLIC BLOOD PRESSURE: 133 MMHG

## 2019-10-08 DIAGNOSIS — J45.909: ICD-10-CM

## 2019-10-08 DIAGNOSIS — Z79.899: ICD-10-CM

## 2019-10-08 DIAGNOSIS — R51: Primary | ICD-10-CM

## 2019-10-08 DIAGNOSIS — Z88.8: ICD-10-CM

## 2019-10-08 DIAGNOSIS — E11.9: ICD-10-CM

## 2019-10-08 DIAGNOSIS — I10: ICD-10-CM

## 2019-10-08 DIAGNOSIS — Z86.73: ICD-10-CM

## 2019-10-08 DIAGNOSIS — Z85.41: ICD-10-CM

## 2019-10-08 DIAGNOSIS — Z85.830: ICD-10-CM

## 2019-10-08 DIAGNOSIS — Z90.49: ICD-10-CM

## 2019-10-08 PROCEDURE — 96372 THER/PROPH/DIAG INJ SC/IM: CPT

## 2019-10-08 PROCEDURE — 70450 CT HEAD/BRAIN W/O DYE: CPT

## 2019-10-08 PROCEDURE — 99284 EMERGENCY DEPT VISIT MOD MDM: CPT

## 2019-11-21 ENCOUNTER — HOSPITAL ENCOUNTER (EMERGENCY)
Dept: HOSPITAL 4 - SED | Age: 49
Discharge: HOME | End: 2019-11-21
Payer: COMMERCIAL

## 2019-11-21 VITALS — HEIGHT: 69 IN | WEIGHT: 219 LBS | BODY MASS INDEX: 32.44 KG/M2

## 2019-11-21 VITALS — SYSTOLIC BLOOD PRESSURE: 143 MMHG

## 2019-11-21 DIAGNOSIS — I87.8: Primary | ICD-10-CM

## 2019-11-21 DIAGNOSIS — I51.89: ICD-10-CM

## 2019-11-21 DIAGNOSIS — Z88.8: ICD-10-CM

## 2019-11-21 DIAGNOSIS — E11.9: ICD-10-CM

## 2019-11-21 DIAGNOSIS — Z90.49: ICD-10-CM

## 2019-11-21 DIAGNOSIS — Z85.41: ICD-10-CM

## 2019-11-21 DIAGNOSIS — J45.909: ICD-10-CM

## 2019-11-21 DIAGNOSIS — Z85.830: ICD-10-CM

## 2019-11-21 DIAGNOSIS — I10: ICD-10-CM

## 2019-11-21 DIAGNOSIS — I67.9: ICD-10-CM

## 2019-11-21 DIAGNOSIS — N28.9: ICD-10-CM

## 2019-11-21 DIAGNOSIS — Z79.899: ICD-10-CM

## 2019-11-21 DIAGNOSIS — E04.1: ICD-10-CM

## 2019-11-21 DIAGNOSIS — M79.7: ICD-10-CM

## 2019-11-21 NOTE — NUR
PT STATES THAT SHE HAD FULL WORKUP FOR HER SWOLLEN LEGS AT [facility] 5 DAYS AGO. 
PT STATES SHE WAS TOLD TO INCREASE HER LASIX, PT STATES SHE HAS 8/10 PAIN, PT 
HAS INDWELLING FENTANYL AND DILAUDID PUMP.

## 2019-11-21 NOTE — NUR
Patient given verbal discharge instructions and verbalizes understanding.  ER 
MD discussed with patient the results and treatment provided. Patient in stable 
condition. ID arm band removed. Patient refuses to sign discharge papers.

No Rx given. Patient educated on pain management and to follow up with PMD in 
2-3 days. Pain Scale 0/10

Opportunity for questions provided and answered. Medication side effect fact 
sheet provided.